# Patient Record
Sex: MALE | Race: WHITE | NOT HISPANIC OR LATINO | URBAN - METROPOLITAN AREA
[De-identification: names, ages, dates, MRNs, and addresses within clinical notes are randomized per-mention and may not be internally consistent; named-entity substitution may affect disease eponyms.]

---

## 2022-02-08 DIAGNOSIS — J01.90 ACUTE SINUSITIS, RECURRENCE NOT SPECIFIED, UNSPECIFIED LOCATION: Primary | ICD-10-CM

## 2022-02-08 RX ORDER — PREDNISONE 20 MG/1
20 TABLET ORAL DAILY
Qty: 5 TABLET | Refills: 0 | Status: SHIPPED | OUTPATIENT
Start: 2022-02-08

## 2022-02-08 RX ORDER — AMOXICILLIN AND CLAVULANATE POTASSIUM 875; 125 MG/1; MG/1
1 TABLET, FILM COATED ORAL 2 TIMES DAILY
Qty: 28 TABLET | Refills: 0 | Status: SHIPPED | OUTPATIENT
Start: 2022-02-08

## 2022-06-17 ENCOUNTER — DOCUMENTATION (OUTPATIENT)
Dept: UROLOGY | Facility: CLINIC | Age: 27
End: 2022-06-17
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
## 2022-06-17 DIAGNOSIS — S70.369A INSECT BITE OF THIGH, UNSPECIFIED LATERALITY, INITIAL ENCOUNTER: Primary | ICD-10-CM

## 2022-06-17 RX ORDER — DOXYCYCLINE HYCLATE 100 MG/1
100 CAPSULE ORAL 2 TIMES DAILY
Qty: 14 CAPSULE | Refills: 0 | Status: SHIPPED | OUTPATIENT
Start: 2022-06-17 | End: 2022-06-24

## 2023-12-03 ENCOUNTER — DOCUMENTATION (OUTPATIENT)
Dept: UROLOGY | Facility: CLINIC | Age: 28
End: 2023-12-03

## 2023-12-03 DIAGNOSIS — S29.019A THORACIC MYOFASCIAL STRAIN, INITIAL ENCOUNTER: Primary | ICD-10-CM

## 2023-12-03 RX ORDER — BACLOFEN 20 MG/1
20 TABLET ORAL 3 TIMES DAILY
Qty: 30 TABLET | Refills: 0 | Status: SHIPPED | OUTPATIENT
Start: 2023-12-03 | End: 2023-12-13

## 2023-12-03 RX ORDER — PREDNISONE 50 MG/1
50 TABLET ORAL DAILY
Qty: 5 TABLET | Refills: 0 | Status: SHIPPED | OUTPATIENT
Start: 2023-12-03 | End: 2023-12-08

## 2024-01-29 ENCOUNTER — DOCUMENTATION (OUTPATIENT)
Dept: UROLOGY | Facility: CLINIC | Age: 29
End: 2024-01-29

## 2024-01-29 DIAGNOSIS — R11.0 NAUSEA WITHOUT VOMITING: Primary | ICD-10-CM

## 2024-01-29 RX ORDER — ONDANSETRON 4 MG/1
4 TABLET, ORALLY DISINTEGRATING ORAL EVERY 8 HOURS PRN
Qty: 30 TABLET | Refills: 1 | Status: SHIPPED | OUTPATIENT
Start: 2024-01-29

## 2024-12-04 ENCOUNTER — OFFICE VISIT (OUTPATIENT)
Dept: SURGERY | Facility: CLINIC | Age: 29
End: 2024-12-04
Payer: COMMERCIAL

## 2024-12-04 VITALS — OXYGEN SATURATION: 97 % | RESPIRATION RATE: 16 BRPM | WEIGHT: 132 LBS

## 2024-12-04 DIAGNOSIS — K64.4 EXTERNAL HEMORRHOID: Primary | ICD-10-CM

## 2024-12-04 RX ORDER — HYDROCORTISONE ACETATE 25 MG/1
SUPPOSITORY RECTAL
COMMUNITY
Start: 2024-11-30

## 2024-12-04 RX ORDER — LIDOCAINE 50 MG/G
1 OINTMENT TOPICAL
Qty: 1 EACH | Refills: 2 | Status: SHIPPED | OUTPATIENT
Start: 2024-12-04

## 2024-12-04 NOTE — PROGRESS NOTES
Patient Name:  Maciej Gutierres  YOB: 1995  [id number]    Referring Provider: No ref. provider found    Patient Care Team:  Alireza Sands MD (Inactive) as PCP - General      Chief Complaint  Hemorrhoids    Subjective     Maciej Gutierres is a 29 y.o. male who presents to [facility]    History of Present Illness  29-year-old male who presents today as a new referral for external hemorrhoids.  The past few weeks patient has had increasing pain and occasional blood per rectum from his hemorrhoids.  He has a longstanding history of hemorrhoids which have intermittently caused him problems but not as severe in the past.  He has been using Preparation H at home to combat his symptoms which has not helped.      History     Past Medical History:   Diagnosis Date    Hemorrhoids        No past surgical history on file.    No family history on file.    Social History     Tobacco Use    Smoking status: Never    Smokeless tobacco: Never   Vaping Use    Vaping status: Never Used       No Known Allergies    Prior to Admission medications    Medication Sig Start Date End Date Taking? Authorizing Provider   amoxicillin-clavulanate (Augmentin) 875-125 MG per tablet Take 1 tablet by mouth 2 (Two) Times a Day. 2/8/22  Yes Zulema Carrera APRN   hydrocortisone (ANUSOL-HC) 25 MG suppository insert ONE SUPPOSITORY RECTALLY EVERY NIGHT AT BEDTIME 11/30/24  Yes Leigh Ann Beaulieu MD   ondansetron ODT (ZOFRAN-ODT) 4 MG disintegrating tablet Place 1 tablet on the tongue Every 8 (Eight) Hours As Needed for Nausea or Vomiting. 1/29/24  Yes Zulema Carrera APRN   predniSONE (DELTASONE) 20 MG tablet Take 1 tablet by mouth Daily. 2/8/22  Yes Zulema Carrera APRN   lidocaine (XYLOCAINE) 5 % ointment Apply 1 Application topically to the appropriate area as directed Every 2 (Two) Hours As Needed for Mild Pain. 12/4/24   Reuben Rueda MD       Objective    Objective       BMI  cannot be calculated due to outdated height or weight values.  Please input a current height/weight in Vitals and re-renter BMIFOLLOWUP in Note to pull in correct documentation based on BMI range.      Vital Signs:   Resp 16   Wt 59.9 kg (132 lb)   SpO2 97%       Physical Exam  Exam conducted with a chaperone present.   Constitutional:       Appearance: Normal appearance.   HENT:      Head: Normocephalic and atraumatic.      Mouth/Throat:      Mouth: Mucous membranes are moist.      Pharynx: Oropharynx is clear.   Cardiovascular:      Rate and Rhythm: Normal rate and regular rhythm.   Pulmonary:      Effort: Pulmonary effort is normal. No respiratory distress.   Abdominal:      General: There is no distension.      Palpations: Abdomen is soft.      Tenderness: There is no abdominal tenderness.   Genitourinary:     Comments: Two moderately sized external hemorrhoids without evidence of thrombosis, no blood on rectal exam  Musculoskeletal:         General: No swelling. Normal range of motion.      Cervical back: Normal range of motion and neck supple.   Skin:     General: Skin is warm and dry.   Neurological:      General: No focal deficit present.      Mental Status: He is alert and oriented to person, place, and time.   Psychiatric:         Mood and Affect: Mood normal.         Behavior: Behavior normal.                Assessment / Plan      Diagnoses and all orders for this visit:    1. External hemorrhoid (Primary)  -     lidocaine (XYLOCAINE) 5 % ointment; Apply 1 Application topically to the appropriate area as directed Every 2 (Two) Hours As Needed for Mild Pain.  Dispense: 1 each; Refill: 2    29-year-old male with symptomatic external hemorrhoids.  Recommend attempts at conservative management with lidocaine jelly and over-the-counter stool softeners.  Will attempt this for 2 weeks and assess for improvement in symptoms at that time.  May ultimately require formal hemorrhoidectomy if this is  unsuccessful.    Follow Up   Return in about 2 weeks (around 12/18/2024).      Patient was given instructions and counseling regarding his condition or for health maintenance advice. Please see specific information pulled into the AVS if appropriate.     Electronically signed by Reuben Rueda MD, 12/04/24, 2:50 PM EST.

## 2024-12-18 ENCOUNTER — TELEPHONE (OUTPATIENT)
Dept: SURGERY | Facility: CLINIC | Age: 29
End: 2024-12-18

## 2024-12-18 ENCOUNTER — OFFICE VISIT (OUTPATIENT)
Dept: SURGERY | Facility: CLINIC | Age: 29
End: 2024-12-18
Payer: COMMERCIAL

## 2024-12-18 VITALS — WEIGHT: 132 LBS | HEIGHT: 71 IN | BODY MASS INDEX: 18.48 KG/M2 | RESPIRATION RATE: 16 BRPM

## 2024-12-18 DIAGNOSIS — K64.4 EXTERNAL HEMORRHOID: Primary | ICD-10-CM

## 2024-12-18 NOTE — TELEPHONE ENCOUNTER
Attempted to call patient to move his appt time for today down. I would like to move him to 3pm if he is willing. If not, we can move him down to 2/2:15.

## 2024-12-18 NOTE — PROGRESS NOTES
"Chief Complaint  Follow-up (Hemorrhoids)    Subjective    Subjective     Maciej Gutierres is a 29 y.o. male who presents to [facility]    History of Present Illness  29-year-old male who presents today for follow-up of external hemorrhoids.  Since last evaluated the office, patient has been taking stool softeners daily and notes significant improvement in his symptoms.  He denies any pain or bleeding currently and says his hemorrhoids have gotten significantly smaller.  He has also been intermittently applying lidocaine ointment when needed.      Personal History     Social History     Tobacco Use    Smoking status: Never    Smokeless tobacco: Never   Vaping Use    Vaping status: Never Used     No Known Allergies    Objective    Objective       Vital Signs:   Resp 16   Ht 180.3 cm (71\")   Wt 59.9 kg (132 lb)   BMI 18.41 kg/m²       Physical Exam  Constitutional:       Appearance: Normal appearance.   HENT:      Head: Normocephalic and atraumatic.      Mouth/Throat:      Mouth: Mucous membranes are moist.      Pharynx: Oropharynx is clear.   Cardiovascular:      Rate and Rhythm: Normal rate and regular rhythm.   Pulmonary:      Effort: Pulmonary effort is normal. No respiratory distress.   Abdominal:      General: There is no distension.      Palpations: Abdomen is soft.      Tenderness: There is no abdominal tenderness.   Musculoskeletal:         General: No swelling. Normal range of motion.      Cervical back: Normal range of motion and neck supple.   Skin:     General: Skin is warm and dry.   Neurological:      General: No focal deficit present.      Mental Status: He is alert and oriented to person, place, and time.   Psychiatric:         Mood and Affect: Mood normal.         Behavior: Behavior normal.                  Assessment / Plan      Diagnoses and all orders for this visit:    1. External hemorrhoid (Primary)    29-year-old male with external hemorrhoids.  Continue " medical management with stool softeners as he has had significant improvement with this over the past 2 weeks.  If symptoms return we will consider formal hemorrhoidectomy in the future.  He may otherwise follow-up with me again in clinic as needed.    Follow Up   Return if symptoms worsen or fail to improve.      Patient was given instructions and counseling regarding his condition or for health maintenance advice. Please see specific information pulled into the AVS if appropriate.     Electronically signed by Reuben Rueda MD, 12/18/24, 3:16 PM EST.

## 2024-12-30 ENCOUNTER — DOCUMENTATION (OUTPATIENT)
Dept: UROLOGY | Age: 29
End: 2024-12-30
Payer: COMMERCIAL

## 2024-12-30 DIAGNOSIS — J40 BRONCHITIS: Primary | ICD-10-CM

## 2024-12-30 RX ORDER — BROMPHENIRAMINE MALEATE, PSEUDOEPHEDRINE HYDROCHLORIDE, AND DEXTROMETHORPHAN HYDROBROMIDE 2; 30; 10 MG/5ML; MG/5ML; MG/5ML
5 SYRUP ORAL 4 TIMES DAILY PRN
Qty: 120 ML | Refills: 0 | Status: SHIPPED | OUTPATIENT
Start: 2024-12-30